# Patient Record
Sex: MALE | Race: BLACK OR AFRICAN AMERICAN | URBAN - METROPOLITAN AREA
[De-identification: names, ages, dates, MRNs, and addresses within clinical notes are randomized per-mention and may not be internally consistent; named-entity substitution may affect disease eponyms.]

---

## 2020-05-13 ENCOUNTER — HISTORICAL (OUTPATIENT)
Dept: RADIOLOGY | Facility: HOSPITAL | Age: 70
End: 2020-05-13

## 2020-05-14 LAB — GRAM STN SPEC: NORMAL

## 2022-03-14 ENCOUNTER — HISTORICAL (OUTPATIENT)
Dept: RADIOLOGY | Facility: HOSPITAL | Age: 72
End: 2022-03-14

## 2022-03-14 ENCOUNTER — HISTORICAL (OUTPATIENT)
Dept: ADMINISTRATIVE | Facility: HOSPITAL | Age: 72
End: 2022-03-14

## 2024-11-29 ENCOUNTER — HOSPITAL ENCOUNTER (EMERGENCY)
Facility: HOSPITAL | Age: 74
Discharge: HOME | End: 2024-11-29
Attending: STUDENT IN AN ORGANIZED HEALTH CARE EDUCATION/TRAINING PROGRAM
Payer: COMMERCIAL

## 2024-11-29 VITALS
SYSTOLIC BLOOD PRESSURE: 150 MMHG | HEART RATE: 60 BPM | BODY MASS INDEX: 28 KG/M2 | HEIGHT: 71 IN | WEIGHT: 200 LBS | DIASTOLIC BLOOD PRESSURE: 87 MMHG | RESPIRATION RATE: 18 BRPM | OXYGEN SATURATION: 100 % | TEMPERATURE: 98 F

## 2024-11-29 DIAGNOSIS — F41.9 ANXIETY: Primary | ICD-10-CM

## 2024-11-29 DIAGNOSIS — R06.02 SHORTNESS OF BREATH: ICD-10-CM

## 2024-11-29 PROBLEM — K21.9 GASTROESOPHAGEAL REFLUX DISEASE: Status: ACTIVE | Noted: 2024-11-29

## 2024-11-29 PROBLEM — I10 HYPERTENSION: Status: ACTIVE | Noted: 2024-11-29

## 2024-11-29 PROBLEM — E78.5 HYPERLIPIDEMIA: Status: ACTIVE | Noted: 2024-11-29

## 2024-11-29 PROBLEM — I25.10 ASCVD (ARTERIOSCLEROTIC CARDIOVASCULAR DISEASE): Status: ACTIVE | Noted: 2024-11-29

## 2024-11-29 PROBLEM — E03.9 HYPOTHYROIDISM: Status: ACTIVE | Noted: 2024-11-29

## 2024-11-29 PROBLEM — N40.0 BENIGN PROSTATIC HYPERPLASIA: Status: ACTIVE | Noted: 2024-11-29

## 2024-11-29 PROBLEM — I48.0 PAF (PAROXYSMAL ATRIAL FIBRILLATION): Status: ACTIVE | Noted: 2024-11-29

## 2024-11-29 PROBLEM — K57.92 DIVERTICULITIS: Status: ACTIVE | Noted: 2024-11-29

## 2024-11-29 PROBLEM — I38 VHD (VALVULAR HEART DISEASE): Status: ACTIVE | Noted: 2024-11-29

## 2024-11-29 LAB
ALBUMIN SERPL-MCNC: 4.1 G/DL (ref 3.4–4.8)
ALBUMIN/GLOB SERPL: 1.5 RATIO (ref 1.1–2)
ALP SERPL-CCNC: 82 UNIT/L (ref 40–150)
ALT SERPL-CCNC: 19 UNIT/L (ref 0–55)
ANION GAP SERPL CALC-SCNC: 13 MEQ/L
AST SERPL-CCNC: 15 UNIT/L (ref 5–34)
BACTERIA #/AREA URNS AUTO: ABNORMAL /HPF
BASOPHILS # BLD AUTO: 0.05 X10(3)/MCL
BASOPHILS NFR BLD AUTO: 0.4 %
BILIRUB SERPL-MCNC: 1.2 MG/DL
BILIRUB UR QL STRIP.AUTO: NEGATIVE
BNP BLD-MCNC: 18.4 PG/ML
BUN SERPL-MCNC: 14.1 MG/DL (ref 8.4–25.7)
CALCIUM SERPL-MCNC: 9.3 MG/DL (ref 8.8–10)
CHLORIDE SERPL-SCNC: 108 MMOL/L (ref 98–107)
CLARITY UR: CLEAR
CO2 SERPL-SCNC: 18 MMOL/L (ref 23–31)
COLOR UR AUTO: YELLOW
CREAT SERPL-MCNC: 0.84 MG/DL (ref 0.72–1.25)
CREAT/UREA NIT SERPL: 17
EOSINOPHIL # BLD AUTO: 0.03 X10(3)/MCL (ref 0–0.9)
EOSINOPHIL NFR BLD AUTO: 0.2 %
ERYTHROCYTE [DISTWIDTH] IN BLOOD BY AUTOMATED COUNT: 12.5 % (ref 11.5–17)
FLUAV AG UPPER RESP QL IA.RAPID: NOT DETECTED
FLUBV AG UPPER RESP QL IA.RAPID: NOT DETECTED
GFR SERPLBLD CREATININE-BSD FMLA CKD-EPI: >60 ML/MIN/1.73/M2
GLOBULIN SER-MCNC: 2.8 GM/DL (ref 2.4–3.5)
GLUCOSE SERPL-MCNC: 126 MG/DL (ref 82–115)
GLUCOSE UR QL STRIP: NORMAL
HCT VFR BLD AUTO: 50.5 % (ref 42–52)
HGB BLD-MCNC: 17.7 G/DL (ref 14–18)
HGB UR QL STRIP: NEGATIVE
HOLD SPECIMEN: NORMAL
HOLD SPECIMEN: NORMAL
HYALINE CASTS #/AREA URNS LPF: ABNORMAL /LPF
IMM GRANULOCYTES # BLD AUTO: 0.05 X10(3)/MCL (ref 0–0.04)
IMM GRANULOCYTES NFR BLD AUTO: 0.4 %
KETONES UR QL STRIP: ABNORMAL
LEUKOCYTE ESTERASE UR QL STRIP: NEGATIVE
LIPASE SERPL-CCNC: 13 U/L
LYMPHOCYTES # BLD AUTO: 2.91 X10(3)/MCL (ref 0.6–4.6)
LYMPHOCYTES NFR BLD AUTO: 21.9 %
MAGNESIUM SERPL-MCNC: 2.4 MG/DL (ref 1.6–2.6)
MCH RBC QN AUTO: 31.2 PG (ref 27–31)
MCHC RBC AUTO-ENTMCNC: 35 G/DL (ref 33–36)
MCV RBC AUTO: 89.1 FL (ref 80–94)
MONOCYTES # BLD AUTO: 1.43 X10(3)/MCL (ref 0.1–1.3)
MONOCYTES NFR BLD AUTO: 10.8 %
MUCOUS THREADS URNS QL MICRO: ABNORMAL /LPF
NEUTROPHILS # BLD AUTO: 8.83 X10(3)/MCL (ref 2.1–9.2)
NEUTROPHILS NFR BLD AUTO: 66.3 %
NITRITE UR QL STRIP: NEGATIVE
NRBC BLD AUTO-RTO: 0 %
PH UR STRIP: 7 [PH]
PLATELET # BLD AUTO: 365 X10(3)/MCL (ref 130–400)
PMV BLD AUTO: 9.2 FL (ref 7.4–10.4)
POTASSIUM SERPL-SCNC: 3.6 MMOL/L (ref 3.5–5.1)
PROT SERPL-MCNC: 6.9 GM/DL (ref 5.8–7.6)
PROT UR QL STRIP: ABNORMAL
RBC # BLD AUTO: 5.67 X10(6)/MCL (ref 4.7–6.1)
RBC #/AREA URNS AUTO: ABNORMAL /HPF
RSV A 5' UTR RNA NPH QL NAA+PROBE: NOT DETECTED
SARS-COV-2 RNA RESP QL NAA+PROBE: NOT DETECTED
SODIUM SERPL-SCNC: 139 MMOL/L (ref 136–145)
SP GR UR STRIP.AUTO: 1.02 (ref 1–1.03)
SQUAMOUS #/AREA URNS LPF: ABNORMAL /HPF
T4 FREE SERPL-MCNC: 1.47 NG/DL (ref 0.7–1.48)
TROPONIN I SERPL-MCNC: 0.01 NG/ML (ref 0–0.04)
TSH SERPL-ACNC: 0.13 UIU/ML (ref 0.35–4.94)
UROBILINOGEN UR STRIP-ACNC: NORMAL
WBC # BLD AUTO: 13.3 X10(3)/MCL (ref 4.5–11.5)
WBC #/AREA URNS AUTO: ABNORMAL /HPF

## 2024-11-29 PROCEDURE — 84439 ASSAY OF FREE THYROXINE: CPT | Performed by: STUDENT IN AN ORGANIZED HEALTH CARE EDUCATION/TRAINING PROGRAM

## 2024-11-29 PROCEDURE — 83880 ASSAY OF NATRIURETIC PEPTIDE: CPT | Performed by: STUDENT IN AN ORGANIZED HEALTH CARE EDUCATION/TRAINING PROGRAM

## 2024-11-29 PROCEDURE — 83690 ASSAY OF LIPASE: CPT | Performed by: STUDENT IN AN ORGANIZED HEALTH CARE EDUCATION/TRAINING PROGRAM

## 2024-11-29 PROCEDURE — 84484 ASSAY OF TROPONIN QUANT: CPT | Performed by: STUDENT IN AN ORGANIZED HEALTH CARE EDUCATION/TRAINING PROGRAM

## 2024-11-29 PROCEDURE — 83735 ASSAY OF MAGNESIUM: CPT | Performed by: STUDENT IN AN ORGANIZED HEALTH CARE EDUCATION/TRAINING PROGRAM

## 2024-11-29 PROCEDURE — 80053 COMPREHEN METABOLIC PANEL: CPT | Performed by: STUDENT IN AN ORGANIZED HEALTH CARE EDUCATION/TRAINING PROGRAM

## 2024-11-29 PROCEDURE — 25500020 PHARM REV CODE 255

## 2024-11-29 PROCEDURE — 99285 EMERGENCY DEPT VISIT HI MDM: CPT | Mod: 25

## 2024-11-29 PROCEDURE — 84443 ASSAY THYROID STIM HORMONE: CPT | Performed by: STUDENT IN AN ORGANIZED HEALTH CARE EDUCATION/TRAINING PROGRAM

## 2024-11-29 PROCEDURE — 81001 URINALYSIS AUTO W/SCOPE: CPT | Performed by: STUDENT IN AN ORGANIZED HEALTH CARE EDUCATION/TRAINING PROGRAM

## 2024-11-29 PROCEDURE — 93005 ELECTROCARDIOGRAM TRACING: CPT

## 2024-11-29 PROCEDURE — 0241U COVID/RSV/FLU A&B PCR: CPT | Performed by: STUDENT IN AN ORGANIZED HEALTH CARE EDUCATION/TRAINING PROGRAM

## 2024-11-29 PROCEDURE — 85025 COMPLETE CBC W/AUTO DIFF WBC: CPT | Performed by: STUDENT IN AN ORGANIZED HEALTH CARE EDUCATION/TRAINING PROGRAM

## 2024-11-29 RX ADMIN — IOHEXOL 100 ML: 350 INJECTION, SOLUTION INTRAVENOUS at 06:11

## 2024-11-29 NOTE — ED PROVIDER NOTES
Encounter Date: 11/29/2024  History from patient     History     Chief Complaint   Patient presents with    throat pain     Reports throat pain, trouble swallowing, intermittent SOB, generalized weakness getting worse for the past 2.5 months. Airway patent.     HPI    Samuel Choi is 74 y.o. male who  has no past medical history on file. arrives in ER with c/o throat pain (Reports throat pain, trouble swallowing, intermittent SOB, generalized weakness getting worse for the past 2.5 months. Airway patent.)      Review of patient's allergies indicates:  No Known Allergies  History reviewed. No pertinent past medical history.  History reviewed. No pertinent surgical history.  No family history on file.  Social History     Tobacco Use    Smoking status: Unknown     Review of Systems   Constitutional:  Negative for fever.   HENT:  Positive for sore throat and trouble swallowing. Negative for voice change.    Eyes:  Negative for visual disturbance.   Respiratory:  Positive for shortness of breath. Negative for cough.    Cardiovascular:  Negative for chest pain.   Gastrointestinal:  Negative for abdominal pain, diarrhea and vomiting.   Genitourinary:  Negative for dysuria and hematuria.   Musculoskeletal:  Negative for back pain and gait problem.   Skin:  Negative for color change and rash.   Neurological:  Negative for headaches.   Psychiatric/Behavioral:  Negative for behavioral problems and sleep disturbance.    All other systems reviewed and are negative.      Physical Exam     Initial Vitals [11/29/24 1639]   BP Pulse Resp Temp SpO2   (!) 184/106 71 20 98.1 °F (36.7 °C) 96 %      MAP       --         Physical Exam    Nursing note and vitals reviewed.  Constitutional: He appears well-developed and well-nourished. No distress.   HENT:   Head: Normocephalic and atraumatic.   Right Ear: External ear normal.   Left Ear: External ear normal.   Nose: Nose normal. Mouth/Throat: Oropharynx is clear and moist.   Eyes:  EOM are normal. Pupils are equal, round, and reactive to light.   Neck: Neck supple.   Normal range of motion.  Cardiovascular:  Normal rate, regular rhythm and normal heart sounds.           Pulmonary/Chest: Breath sounds normal. No respiratory distress. He has no wheezes. He has no rhonchi. He has no rales.   Abdominal: Abdomen is soft. Bowel sounds are normal.   Musculoskeletal:         General: No edema. Normal range of motion.      Cervical back: Normal range of motion and neck supple. No bony tenderness.     Neurological: He is alert.   Speech Normal   Skin: Skin is dry.   Psychiatric:   Anxious         ED Course   Procedures  Orders Placed This Encounter    X-Ray Chest PA And Lateral    CT Soft Tissue Neck With Contrast    CBC auto differential    Comprehensive metabolic panel    COVID/RSV/FLU A&B PCR    Magnesium    Lipase    Troponin I    TSH    Urinalysis, Reflex to Urine Culture    CBC with Differential    Brain natriuretic peptide    EXTRA TUBES    Light Blue Top Hold    Light Green Top Hold    T4, Free    Cardiac Monitoring - Adult    Pulse Oximetry Continuous    EKG 12-lead    Insert Saline lock IV    iohexoL (OMNIPAQUE 350) 350 mg iodine/mL injection       Labs Reviewed   COMPREHENSIVE METABOLIC PANEL - Abnormal       Result Value    Sodium 139      Potassium 3.6      Chloride 108 (*)     CO2 18 (*)     Glucose 126 (*)     Blood Urea Nitrogen 14.1      Creatinine 0.84      Calcium 9.3      Protein Total 6.9      Albumin 4.1      Globulin 2.8      Albumin/Globulin Ratio 1.5      Bilirubin Total 1.2      ALP 82      ALT 19      AST 15      eGFR >60      Anion Gap 13.0      BUN/Creatinine Ratio 17     TSH - Abnormal    TSH 0.127 (*)    URINALYSIS, REFLEX TO URINE CULTURE - Abnormal    Color, UA Yellow      Appearance, UA Clear      Specific Gravity, UA 1.016      pH, UA 7.0      Protein, UA 1+ (*)     Glucose, UA Normal      Ketones, UA Trace (*)     Blood, UA Negative      Bilirubin, UA Negative       Urobilinogen, UA Normal      Nitrites, UA Negative      Leukocyte Esterase, UA Negative      RBC, UA 0-5      WBC, UA 0-5      Bacteria, UA Trace (*)     Squamous Epithelial Cells, UA Trace (*)     Mucous, UA Trace (*)     Hyaline Casts, UA 0-2 (*)    CBC WITH DIFFERENTIAL - Abnormal    WBC 13.30 (*)     RBC 5.67      Hgb 17.7      Hct 50.5      MCV 89.1      MCH 31.2 (*)     MCHC 35.0      RDW 12.5      Platelet 365      MPV 9.2      Neut % 66.3      Lymph % 21.9      Mono % 10.8      Eos % 0.2      Basophil % 0.4      Lymph # 2.91      Neut # 8.83      Mono # 1.43 (*)     Eos # 0.03      Baso # 0.05      IG# 0.05 (*)     IG% 0.4      NRBC% 0.0     COVID/RSV/FLU A&B PCR - Normal    Influenza A PCR Not Detected      Influenza B PCR Not Detected      Respiratory Syncytial Virus PCR Not Detected      SARS-CoV-2 PCR Not Detected      Narrative:     The Molecular Detection Xpress SARS-CoV-2/FLU/RSV plus is a rapid, multiplexed real-time PCR test intended for the simultaneous qualitative detection and differentiation of SARS-CoV-2, Influenza A, Influenza B, and respiratory syncytial virus (RSV) viral RNA in either nasopharyngeal swab or nasal swab specimens.         MAGNESIUM - Normal    Magnesium Level 2.40     LIPASE - Normal    Lipase Level 13     TROPONIN I - Normal    Troponin-I 0.014     B-TYPE NATRIURETIC PEPTIDE - Normal    Natriuretic Peptide 18.4     T4, FREE - Normal    Thyroxine Free 1.47     CBC W/ AUTO DIFFERENTIAL    Narrative:     The following orders were created for panel order CBC auto differential.  Procedure                               Abnormality         Status                     ---------                               -----------         ------                     CBC with Differential[[id number]]        Abnormal            Final result                 Please view results for these tests on the individual orders.   EXTRA TUBES    Narrative:     The following orders were created for panel order EXTRA  TUBES.  Procedure                               Abnormality         Status                     ---------                               -----------         ------                     Light Blue Top Hold[[id number]]                              Final result               Light Green Top Hold[[id number]]                            Final result                 Please view results for these tests on the individual orders.   LIGHT BLUE TOP HOLD    Extra Tube Hold for add-ons.     LIGHT GREEN TOP HOLD    Extra Tube Hold for add-ons.       EKG Readings: (Independently Interpreted)   Initial Reading: No STEMI. Rhythm: Normal Sinus Rhythm. Heart Rate: 63. Ectopy: No Ectopy. Conduction: Normal. Axis: Normal.     ECG Results              EKG 12-lead (In process)        Collection Time Result Time QRS Duration [facility] QTC Calculation    11/29/24 17:02:58 11/29/24 17:11:21 88 421                     In process by Interface, Lab In [facility] (11/29/24 17:11:28)                   Narrative:    Test Reason : R06.02,    Vent. Rate :  63 BPM     Atrial Rate :  63 BPM     P-R Int : 178 ms          QRS Dur :  88 ms      QT Int : 412 ms       P-R-T Axes :   2 -18  60 degrees    QTcB Int : 421 ms    Normal sinus rhythm  Normal ECG  No previous ECGs available    Referred By:            Confirmed By:                                   Imaging Results              CT Soft Tissue Neck With Contrast (Final result)  Result time 11/29/24 18:34:43      Final result by Raad Branham MD (11/29/24 18:34:43)                   Impression:      No defined neck mass identified.      Electronically signed by: Raad Branham  Date:    11/29/2024  Time:    18:34               Narrative:    EXAMINATION:  CT SOFT TISSUE NECK WITH CONTRAST    CLINICAL HISTORY:  Neck mass, nonpulsatile;    TECHNIQUE:  CT imaging of the neck after IV contrast. Axial, sagittal, and coronal reformatted images reviewed. Dose length product 260 mGycm. Automatic exposure control,  adjustment of mA/kV or iterative reconstruction technique used to limit radiation dose.    COMPARISON:  No relevant comparison studies available at the time of dictation.    FINDINGS:  Symmetric salivary glands.  No cervical lymphadenopathy identified.  No drainable fluid collection.  Normal thyroid.  Cervical spine degenerative changes.                                       X-Ray Chest PA And Lateral (Final result)  Result time 11/29/24 18:06:22      Final result by Raad Branham MD (11/29/24 18:06:22)                   Impression:      No acute pulmonary process appreciated.      Electronically signed by: Raad Branham  Date:    11/29/2024  Time:    18:06               Narrative:    EXAMINATION:  XR CHEST PA AND LATERAL    CLINICAL HISTORY:  Shortness of breath    TECHNIQUE:  Frontal and lateral radiographs of the chest    COMPARISON:  None    FINDINGS:  Normal cardiac silhouette. The lungs are well-inflated. No consolidation identified. No pleural effusion or discernible pneumothorax.                                       Medications   iohexoL (OMNIPAQUE 350) 350 mg iodine/mL injection (100 mLs  Given 11/29/24 1823)     Medical Decision Making    Samuel Choi is 74 y.o. male who  has no past medical history on file. arrives in ER with c/o throat pain (Reports throat pain, trouble swallowing, intermittent SOB, generalized weakness getting worse for the past 2.5 months. Airway patent.)    Patient with history of hypertension, valvular heart disease, paroxysmal AFib, comes to the emergency room with complaint of generalized weakness going on for 2-3 months, trouble swallowing, throat pain, shortness of breath, patient says he saw his family doctor who has been giving antibiotics in his steroids but the symptoms are not improving, comes to the emergency room today from [place] because he goes to the hospital there and they do not do anything, he just finished Augmentin for 10 days, Medrol Dosepak for 7  days, but continues to the same symptoms.    Dr. Humphreys saw him on arrival and started workup he even ordered a CT scan of the soft tissue neck to rule out any abnormality.    Patient's workup is completed now, at this time he has a heart rate of 60, O2 sat is of 100% on room air, blood pressure 150/80 talking to me in full sentences he is concerned that that is something wrong with his throat, when I explained to him the results of his workup that he has normal chest x-ray, normal cardiac workup, normal blood work, and does not appear like that he is having any asthma attack or bronchitis at this time with normal lung sounds and normal oxygen saturation patient says that is probably he has tonsils, and I have explained to him that I do not see any signs of infection of tonsils on him, he just finished Augmentin for 10 days.    I have explained to him that the it appears like that is more related to his anxiety.  I see in the list of his medicines that he has been given multiple rounds of antibiotic and steroids in the last 2 months, he says he has a pump at home and he wants me to prescribe him a nebulizer as his sister-in-law has 1 and I have advised him that with him having perfectly normal lung sounds a normal chest x-ray and 100% oxygen room air I do not think that he is having any bronchitis at this time.  But of course whenever he gets short of breath he can always use the albuterol and he should talk to his family doctor about prescribing the medicines for him.  And he should also follow up with the family doctor.  Patient's friend who is in the room also explained the situation to the patient as she also feels that it is mainly just anxiety that he has.  He has medicine for anxiety at home but he is not taking it, I advised him that he should start taking it.      I am going to let him go home with instruction to follow up with the family doctor.    Amount and/or Complexity of Data Reviewed  Labs:  ordered.  Radiology: ordered.                                      Clinical Impression:  Final diagnoses:  [R06.02] Shortness of breath  [F41.9] Anxiety (Primary)          ED Disposition Condition    Discharge Stable          ED Prescriptions    None       Follow-up Information       Follow up With Specialties Details Why Contact Info    PMD  In 2 days               Aisha Hillman MD  11/29/24 0936

## 2024-12-02 LAB
OHS QRS DURATION: 88 MS
OHS QTC CALCULATION: 421 MS